# Patient Record
Sex: MALE | Race: WHITE | Employment: OTHER | URBAN - METROPOLITAN AREA
[De-identification: names, ages, dates, MRNs, and addresses within clinical notes are randomized per-mention and may not be internally consistent; named-entity substitution may affect disease eponyms.]

---

## 2018-09-10 ENCOUNTER — PAT TELEPHONE (OUTPATIENT)
Dept: PREADMISSION TESTING | Age: 68
End: 2018-09-10

## 2018-09-10 NOTE — PROGRESS NOTES
[facility] time__1200__________        Surgery time___1300_________    Take the following medications with a sip of water:    Do not eat or drink anything after 12:00 midnight prior to your surgery. EXCEPT PREP  This includes water chewing gum, mints and ice chips. You may brush your teeth and gargle the morning of your surgery, but do not swallow the water      You may be asked to stop blood thinners such as Coumadin, Plavix, Fragmin, Lovenox, etc., or any anti-inflammatories such as:  Aspirin, Ibuprofen, Advil, Naproxen prior to your surgery. We also ask that you stop any OTC medications such as fish oil, vitamin E, glucosamine, garlic, Multivitamins, COQ 10, etc.    We ask that you do not smoke 24 hours prior to surgery  We ask that you do not  drink any alcoholic beverages 24 hours prior to surgery     You must make arrangements for a responsible adult to take you home after your surgery. For your safety you will not be allowed to leave alone or drive yourself home. Your surgery will be cancelled if you do not have a ride home. Also for your safety, it is strongly suggested that someone stay with you the first 24 hours after your surgery. A parent or legal guardian must accompany a child scheduled for surgery and plan to stay at the hospital until the child is discharged. Please do not bring other children with you. For your comfort, please wear simple loose fitting clothing to the hospital.  Please do not bring valuables. Do not wear any make-up or nail polish on your fingers or toes      For your safety, please do not wear any jewelry or body piercing's on the day of surgery. All jewelry must be removed. If you have dentures, they will be removed before going to operating room. For your convenience, we will provide you with a container.     If you wear contact lenses or glasses, they will be removed, please bring a case for

## 2018-09-21 ENCOUNTER — HOSPITAL ENCOUNTER (OUTPATIENT)
Dept: ENDOSCOPY | Age: 68
Discharge: HOME | End: 2018-09-22
Attending: INTERNAL MEDICINE | Admitting: INTERNAL MEDICINE

## 2018-09-21 ENCOUNTER — HOSPITAL ENCOUNTER (INPATIENT)
Dept: ICU | Age: 68
LOS: 2 days | Discharge: HOME | DRG: 919 | End: 2018-09-23
Attending: INTERNAL MEDICINE | Admitting: INTERNAL MEDICINE
Payer: MEDICARE

## 2018-09-21 VITALS
TEMPERATURE: 97.6 F | RESPIRATION RATE: 16 BRPM | OXYGEN SATURATION: 97 % | DIASTOLIC BLOOD PRESSURE: 78 MMHG | WEIGHT: 191 LBS | BODY MASS INDEX: 23.26 KG/M2 | SYSTOLIC BLOOD PRESSURE: 128 MMHG | HEART RATE: 78 BPM | HEIGHT: 76 IN

## 2018-09-21 DIAGNOSIS — Z12.11 ENCOUNTER FOR SCREENING FOR MALIGNANT NEOPLASM OF COLON: ICD-10-CM

## 2018-09-21 DIAGNOSIS — K62.5 RECTAL BLEEDING: Primary | ICD-10-CM

## 2018-09-21 DIAGNOSIS — R10.84 GENERALIZED ABDOMINAL PAIN: ICD-10-CM

## 2018-09-21 LAB
A/G RATIO: 1.4 (ref 1.1–2.2)
ABO/RH: NORMAL
ALBUMIN SERPL-MCNC: 3.9 G/DL (ref 3.4–5)
ALP BLD-CCNC: 56 U/L (ref 40–129)
ALT SERPL-CCNC: 25 U/L (ref 10–40)
ANION GAP SERPL CALCULATED.3IONS-SCNC: 12 MMOL/L (ref 3–16)
ANTIBODY SCREEN: NORMAL
APTT: 31.3 SEC (ref 26–36)
AST SERPL-CCNC: 27 U/L (ref 15–37)
BASOPHILS ABSOLUTE: 0 K/UL (ref 0–0.2)
BASOPHILS RELATIVE PERCENT: 0.3 %
BILIRUB SERPL-MCNC: 1 MG/DL (ref 0–1)
BUN BLDV-MCNC: 12 MG/DL (ref 7–20)
CALCIUM SERPL-MCNC: 8.8 MG/DL (ref 8.3–10.6)
CHLORIDE BLD-SCNC: 104 MMOL/L (ref 99–110)
CO2: 23 MMOL/L (ref 21–32)
CREAT SERPL-MCNC: 0.9 MG/DL (ref 0.8–1.3)
EOSINOPHILS ABSOLUTE: 0.2 K/UL (ref 0–0.6)
EOSINOPHILS RELATIVE PERCENT: 1.9 %
GFR AFRICAN AMERICAN: >60
GFR NON-AFRICAN AMERICAN: >60
GLOBULIN: 2.7 G/DL
GLUCOSE BLD-MCNC: 109 MG/DL (ref 70–99)
GLUCOSE BLD-MCNC: 93 MG/DL (ref 70–99)
HCT VFR BLD CALC: 39.6 % (ref 40.5–52.5)
HCT VFR BLD CALC: 40.1 % (ref 40.5–52.5)
HCT VFR BLD CALC: 46 % (ref 40.5–52.5)
HEMOGLOBIN: 13.3 G/DL (ref 13.5–17.5)
HEMOGLOBIN: 13.5 G/DL (ref 13.5–17.5)
HEMOGLOBIN: 15.4 G/DL (ref 13.5–17.5)
INR BLD: 1.09 (ref 0.86–1.14)
LYMPHOCYTES ABSOLUTE: 1.7 K/UL (ref 1–5.1)
LYMPHOCYTES RELATIVE PERCENT: 20.6 %
MCH RBC QN AUTO: 30.9 PG (ref 26–34)
MCHC RBC AUTO-ENTMCNC: 33.6 G/DL (ref 31–36)
MCV RBC AUTO: 92.1 FL (ref 80–100)
MONOCYTES ABSOLUTE: 0.4 K/UL (ref 0–1.3)
MONOCYTES RELATIVE PERCENT: 5.4 %
NEUTROPHILS ABSOLUTE: 5.7 K/UL (ref 1.7–7.7)
NEUTROPHILS RELATIVE PERCENT: 71.8 %
PDW BLD-RTO: 14.3 % (ref 12.4–15.4)
PERFORMED ON: NORMAL
PLATELET # BLD: 173 K/UL (ref 135–450)
PMV BLD AUTO: 8.2 FL (ref 5–10.5)
POTASSIUM REFLEX MAGNESIUM: 4 MMOL/L (ref 3.5–5.1)
PROTHROMBIN TIME: 12.4 SEC (ref 9.8–13)
RBC # BLD: 4.99 M/UL (ref 4.2–5.9)
SODIUM BLD-SCNC: 139 MMOL/L (ref 136–145)
TOTAL PROTEIN: 6.6 G/DL (ref 6.4–8.2)
WBC # BLD: 8 K/UL (ref 4–11)

## 2018-09-21 PROCEDURE — 74174 CTA ABD&PLVS W/CONTRAST: CPT

## 2018-09-21 PROCEDURE — 96361 HYDRATE IV INFUSION ADD-ON: CPT

## 2018-09-21 PROCEDURE — 86850 RBC ANTIBODY SCREEN: CPT

## 2018-09-21 PROCEDURE — 85730 THROMBOPLASTIN TIME PARTIAL: CPT

## 2018-09-21 PROCEDURE — 85025 COMPLETE CBC W/AUTO DIFF WBC: CPT

## 2018-09-21 PROCEDURE — 6360000002 HC RX W HCPCS

## 2018-09-21 PROCEDURE — 2500000003 HC RX 250 WO HCPCS

## 2018-09-21 PROCEDURE — 2720000010 HC SURG SUPPLY STERILE

## 2018-09-21 PROCEDURE — 96375 TX/PRO/DX INJ NEW DRUG ADDON: CPT

## 2018-09-21 PROCEDURE — 3700000000 HC ANESTHESIA ATTENDED CARE

## 2018-09-21 PROCEDURE — 3609010600 HC COLONOSCOPY POLYPECTOMY SNARE/COLD BIOPSY

## 2018-09-21 PROCEDURE — 96374 THER/PROPH/DIAG INJ IV PUSH: CPT

## 2018-09-21 PROCEDURE — 9990 CHARGE CONVERSION

## 2018-09-21 PROCEDURE — 86901 BLOOD TYPING SEROLOGIC RH(D): CPT

## 2018-09-21 PROCEDURE — 80053 COMPREHEN METABOLIC PANEL: CPT

## 2018-09-21 PROCEDURE — 86923 COMPATIBILITY TEST ELECTRIC: CPT

## 2018-09-21 PROCEDURE — 85014 HEMATOCRIT: CPT

## 2018-09-21 PROCEDURE — 99285 EMERGENCY DEPT VISIT HI MDM: CPT

## 2018-09-21 PROCEDURE — 88305 TISSUE EXAM BY PATHOLOGIST: CPT

## 2018-09-21 PROCEDURE — 86900 BLOOD TYPING SEROLOGIC ABO: CPT

## 2018-09-21 PROCEDURE — 36415 COLL VENOUS BLD VENIPUNCTURE: CPT

## 2018-09-21 PROCEDURE — 85018 HEMOGLOBIN: CPT

## 2018-09-21 PROCEDURE — 85610 PROTHROMBIN TIME: CPT

## 2018-09-21 PROCEDURE — 3700000001 HC ADD 15 MINUTES (ANESTHESIA)

## 2018-09-21 RX ORDER — MORPHINE SULFATE 4 MG/ML
1 INJECTION, SOLUTION INTRAMUSCULAR; INTRAVENOUS EVERY 5 MIN PRN
Status: DISCONTINUED | OUTPATIENT
Start: 2018-09-21 | End: 2018-09-28 | Stop reason: HOSPADM

## 2018-09-21 RX ORDER — OXYCODONE HYDROCHLORIDE AND ACETAMINOPHEN 5; 325 MG/1; MG/1
1 TABLET ORAL PRN
Status: ACTIVE | OUTPATIENT
Start: 2018-09-21 | End: 2018-09-21

## 2018-09-21 RX ORDER — PROMETHAZINE HYDROCHLORIDE 25 MG/ML
12.5 INJECTION, SOLUTION INTRAMUSCULAR; INTRAVENOUS ONCE
Status: COMPLETED | OUTPATIENT
Start: 2018-09-21 | End: 2018-09-21

## 2018-09-21 RX ORDER — SODIUM CHLORIDE 0.9 % (FLUSH) 0.9 %
10 SYRINGE (ML) INJECTION PRN
Status: DISCONTINUED | OUTPATIENT
Start: 2018-09-21 | End: 2018-09-28 | Stop reason: HOSPADM

## 2018-09-21 RX ORDER — 0.9 % SODIUM CHLORIDE 0.9 %
250 INTRAVENOUS SOLUTION INTRAVENOUS ONCE
Status: DISCONTINUED | OUTPATIENT
Start: 2018-09-21 | End: 2018-09-23 | Stop reason: HOSPADM

## 2018-09-21 RX ORDER — FENTANYL CITRATE 50 UG/ML
25 INJECTION, SOLUTION INTRAMUSCULAR; INTRAVENOUS EVERY 5 MIN PRN
Status: DISCONTINUED | OUTPATIENT
Start: 2018-09-21 | End: 2018-09-28 | Stop reason: HOSPADM

## 2018-09-21 RX ORDER — SODIUM CHLORIDE 0.9 % (FLUSH) 0.9 %
10 SYRINGE (ML) INJECTION PRN
Status: DISCONTINUED | OUTPATIENT
Start: 2018-09-21 | End: 2018-09-23 | Stop reason: HOSPADM

## 2018-09-21 RX ORDER — NICOTINE 21 MG/24HR
1 PATCH, TRANSDERMAL 24 HOURS TRANSDERMAL DAILY PRN
Status: DISCONTINUED | OUTPATIENT
Start: 2018-09-21 | End: 2018-09-23 | Stop reason: HOSPADM

## 2018-09-21 RX ORDER — FENTANYL CITRATE 50 UG/ML
50 INJECTION, SOLUTION INTRAMUSCULAR; INTRAVENOUS EVERY 5 MIN PRN
Status: DISCONTINUED | OUTPATIENT
Start: 2018-09-21 | End: 2018-09-28 | Stop reason: HOSPADM

## 2018-09-21 RX ORDER — SODIUM CHLORIDE 9 MG/ML
INJECTION, SOLUTION INTRAVENOUS CONTINUOUS
Status: DISCONTINUED | OUTPATIENT
Start: 2018-09-21 | End: 2018-09-21 | Stop reason: SDUPTHER

## 2018-09-21 RX ORDER — ONDANSETRON 2 MG/ML
4 INJECTION INTRAMUSCULAR; INTRAVENOUS EVERY 4 HOURS PRN
Status: DISCONTINUED | OUTPATIENT
Start: 2018-09-21 | End: 2018-09-23 | Stop reason: HOSPADM

## 2018-09-21 RX ORDER — SODIUM CHLORIDE 0.9 % (FLUSH) 0.9 %
10 SYRINGE (ML) INJECTION PRN
Status: DISCONTINUED | OUTPATIENT
Start: 2018-09-21 | End: 2018-09-21 | Stop reason: SDUPTHER

## 2018-09-21 RX ORDER — METOCLOPRAMIDE HYDROCHLORIDE 5 MG/ML
5 INJECTION INTRAMUSCULAR; INTRAVENOUS ONCE
Status: COMPLETED | OUTPATIENT
Start: 2018-09-21 | End: 2018-09-21

## 2018-09-21 RX ORDER — SODIUM CHLORIDE 0.9 % (FLUSH) 0.9 %
10 SYRINGE (ML) INJECTION EVERY 12 HOURS SCHEDULED
Status: DISCONTINUED | OUTPATIENT
Start: 2018-09-21 | End: 2018-09-23 | Stop reason: HOSPADM

## 2018-09-21 RX ORDER — FAMOTIDINE 20 MG/1
20 TABLET, FILM COATED ORAL 2 TIMES DAILY
Status: DISCONTINUED | OUTPATIENT
Start: 2018-09-21 | End: 2018-09-21

## 2018-09-21 RX ORDER — CHLORAL HYDRATE 500 MG
1000 CAPSULE ORAL DAILY
COMMUNITY

## 2018-09-21 RX ORDER — SODIUM CHLORIDE 0.9 % (FLUSH) 0.9 %
10 SYRINGE (ML) INJECTION EVERY 12 HOURS SCHEDULED
Status: DISCONTINUED | OUTPATIENT
Start: 2018-09-21 | End: 2018-09-21 | Stop reason: SDUPTHER

## 2018-09-21 RX ORDER — MORPHINE SULFATE 4 MG/ML
2 INJECTION, SOLUTION INTRAMUSCULAR; INTRAVENOUS EVERY 5 MIN PRN
Status: DISCONTINUED | OUTPATIENT
Start: 2018-09-21 | End: 2018-09-28 | Stop reason: HOSPADM

## 2018-09-21 RX ORDER — 0.9 % SODIUM CHLORIDE 0.9 %
1000 INTRAVENOUS SOLUTION INTRAVENOUS ONCE
Status: COMPLETED | OUTPATIENT
Start: 2018-09-21 | End: 2018-09-21

## 2018-09-21 RX ORDER — ONDANSETRON 2 MG/ML
4 INJECTION INTRAMUSCULAR; INTRAVENOUS
Status: ACTIVE | OUTPATIENT
Start: 2018-09-21 | End: 2018-09-21

## 2018-09-21 RX ORDER — SODIUM CHLORIDE 9 MG/ML
INJECTION, SOLUTION INTRAVENOUS CONTINUOUS
Status: DISCONTINUED | OUTPATIENT
Start: 2018-09-21 | End: 2018-09-28 | Stop reason: HOSPADM

## 2018-09-21 RX ORDER — SODIUM CHLORIDE 9 MG/ML
INJECTION, SOLUTION INTRAVENOUS CONTINUOUS
Status: DISCONTINUED | OUTPATIENT
Start: 2018-09-22 | End: 2018-09-23 | Stop reason: HOSPADM

## 2018-09-21 RX ORDER — SODIUM CHLORIDE 0.9 % (FLUSH) 0.9 %
10 SYRINGE (ML) INJECTION EVERY 12 HOURS SCHEDULED
Status: DISCONTINUED | OUTPATIENT
Start: 2018-09-21 | End: 2018-09-28 | Stop reason: HOSPADM

## 2018-09-21 RX ORDER — OXYCODONE HYDROCHLORIDE AND ACETAMINOPHEN 5; 325 MG/1; MG/1
2 TABLET ORAL PRN
Status: ACTIVE | OUTPATIENT
Start: 2018-09-21 | End: 2018-09-21

## 2018-09-21 RX ORDER — PERPHENAZINE/AMITRIPTYLINE HCL 2 MG-10 MG
4 TABLET ORAL DAILY
COMMUNITY

## 2018-09-21 RX ADMIN — METOCLOPRAMIDE HYDROCHLORIDE 5 MG: 5 INJECTION INTRAMUSCULAR; INTRAVENOUS at 18:31

## 2018-09-21 RX ADMIN — Medication 10 ML: at 21:00

## 2018-09-21 RX ADMIN — PROMETHAZINE HYDROCHLORIDE 12.5 MG: 25 INJECTION, SOLUTION INTRAMUSCULAR; INTRAVENOUS at 17:11

## 2018-09-21 RX ADMIN — Medication 1000 ML: at 15:02

## 2018-09-21 RX ADMIN — SODIUM CHLORIDE: 9 INJECTION, SOLUTION INTRAVENOUS at 12:20

## 2018-09-21 RX ADMIN — SODIUM CHLORIDE 999 ML/HR: 9 INJECTION, SOLUTION INTRAVENOUS at 17:34

## 2018-09-21 ASSESSMENT — PAIN DESCRIPTION - LOCATION: LOCATION: ABDOMEN

## 2018-09-21 ASSESSMENT — PAIN SCALES - GENERAL
PAINLEVEL_OUTOF10: 0
PAINLEVEL_OUTOF10: 7

## 2018-09-21 ASSESSMENT — LIFESTYLE VARIABLES: SMOKING_STATUS: 0

## 2018-09-21 ASSESSMENT — PAIN DESCRIPTION - PAIN TYPE: TYPE: ACUTE PAIN

## 2018-09-21 ASSESSMENT — PAIN DESCRIPTION - FREQUENCY: FREQUENCY: CONTINUOUS

## 2018-09-21 NOTE — ANESTHESIA PRE-OP
Department of Anesthesiology  Preprocedure Note       Name:  Lillian Nance   Age:  80 y.o.  :  1950                                          MRN:  [id number]         Date:  2018      [facility] Department of Anesthesiology  Pre-Anesthesia Evaluation/Consultation       Name:  Lillian Nance                                         Age:  80 y.o. MRN:  [id number]           Procedure (Scheduled):  colonoscopy  Surgeon:  Dr. Nathan Mclean [Meperidine]      There is no problem list on file for this patient. No past medical history on file. Past Surgical History:   Procedure Laterality Date    BACK SURGERY       Social History   Substance Use Topics    Smoking status: Never Smoker    Smokeless tobacco: Never Used    Alcohol use Yes     Medications  No current outpatient prescriptions on file prior to encounter. No current facility-administered medications on file prior to encounter. No current outpatient prescriptions on file. No current facility-administered medications for this encounter. Vital Signs (Current) There were no vitals filed for this visit. Vital Signs Statistics (for past 48 hrs)     No Data Recorded    BP Readings from Last 3 Encounters:   No data found for BP     BMI  There is no height or weight on file to calculate BMI. Estimated body mass index is 23.61 kg/m² as calculated from the following:    Height as of 18: 6' 4\" (1.93 m). Weight as of 18: 194 lb (88 kg). CBC No results found for: WBC, RBC, HGB, HCT, MCV, RDW, PLT  CMP  No results found for: NA, K, CL, CO2, BUN, CREATININE, GFRAA, AGRATIO, LABGLOM, GLUCOSE, PROT, CALCIUM, BILITOT, ALKPHOS, AST, ALT  BMP  No results found for: NA, K, CL, CO2, BUN, CREATININE, CALCIUM, GFRAA, LABGLOM, GLUCOSE  POCGlucose  No results for input(s): GLUCOSE in the last 72 hours.    Coags  No results found for: PROTIME, INR, APTT  HCG (If Applicable) No results found for: PREGTESTUR, PREGSERUM, HCG, HCGQUANT   ABGs No results found for: PHART, PO2ART, LWI2KUD, HEJ0QRL, BEART, J5JYDKPF   Type & Screen (If Applicable)  No results found for: LABABO, [address]    Surgeon:    Procedure:    Medications prior to admission:   Prior to Admission medications    Not on File       Current medications:    No current outpatient prescriptions on file. No current facility-administered medications for this encounter. Allergies: Allergies   Allergen Reactions    Demerol Hcl [Meperidine]        Problem List:  There is no problem list on file for this patient. Past Medical History:  No past medical history on file. Past Surgical History:        Procedure Laterality Date    BACK SURGERY         Social History:    Social History   Substance Use Topics    Smoking status: Never Smoker    Smokeless tobacco: Never Used    Alcohol use Yes                                Counseling given: Not Answered      Vital Signs (Current): There were no vitals filed for this visit. BP Readings from Last 3 Encounters:   No data found for BP       NPO Status:  prep 10 am                                                                               BMI:   Wt Readings from Last 3 Encounters:   09/17/18 194 lb (88 kg)     There is no height or weight on file to calculate BMI. Anesthesia Evaluation  Patient summary reviewed no history of anesthetic complications:   Airway: Mallampati: III  TM distance: <3 FB   Neck ROM: full  Mouth opening: > = 3 FB Dental:          Pulmonary:Negative Pulmonary ROS breath sounds clear to auscultation      (-) not a current smoker          Patient did not smoke on day of surgery.                  Cardiovascular:Negative CV ROS            Rhythm: regular  Rate: normal           Beta Blocker:  Not on Beta Blocker         Neuro/Psych:   Negative Neuro/Psych ROS              GI/Hepatic/Renal:   (+) bowel prep,      (-) liver

## 2018-09-21 NOTE — H&P
[facility]   Pre-operative History and Physical    Patient: Jennifer Hernandez  : 1950  Acct#:         HISTORY OF PRESENT ILLNESS:    The patient is a 80 y.o. male  who presents with colon cancer screening  Past Medical History:    History reviewed. No pertinent past medical history. Past Surgical History:        Procedure Laterality Date    BACK SURGERY       Medications Prior to Admission:   No current outpatient prescriptions on file prior to encounter. No current facility-administered medications on file prior to encounter. Allergies:  Demerol hcl [meperidine]    Social History:      Social History     Social History    Marital status:      Spouse name: N/A    Number of children: N/A    Years of education: N/A     Occupational History    Not on file. Social History Main Topics    Smoking status: Never Smoker    Smokeless tobacco: Never Used    Alcohol use Yes    Drug use: Unknown    Sexual activity: Not on file     Other Topics Concern    Not on file     Social History Narrative    No narrative on file           Family History:   Family History   Problem Relation Age of Onset    Heart Disease Mother     Cancer Father          PHYSICAL EXAM:      /78   Pulse 78   Temp 97.6 °F (36.4 °C) (Temporal)   Resp 16   Ht 6' 4\" (1.93 m)   Wt 191 lb (86.6 kg)   SpO2 97%   BMI 23.25 kg/m²  I        Heart: Normal    Lungs: Normal    Abdomen: Normal      ASA Grade: ASA 2 - Patient with mild systemic disease with no functional limitations    III (soft palate, base of uvula visible)  ASSESSMENT AND PLAN:    1. Patient is a 80 y.o. male here for Colonoscopy  2. Procedure options, risks and benefits reviewed with patient who expresses understanding.

## 2018-09-21 NOTE — H&P
smokeless tobacco.  ETOH:   reports that he drinks alcohol. OCCUPATION:      REVIEW OF SYSTEMS:  A full review of systems was performed and is negative except for that which appears in the HPI    Physical Exam:    Vitals: BP 95/73   Pulse 81   Temp 97.5 °F (36.4 °C) (Oral)   Resp 15   Ht 6' 4\" (1.93 m)   Wt 195 lb 15.8 oz (88.9 kg)   SpO2 96%   BMI 23.86 kg/m²   General appearance: WD/WN 80y.o. year-old  male who is alert, appears stated age and is cooperative  HEENT: Head: Normocephalic, no lesions, without obvious abnormality. Eye: Normal external eye, conjunctiva, lids cornea, JUDY. Ears: Normal external ears. Non-tender. Nose: Normal external nose, mucus membranes and septum. Pharynx: Dental Hygiene adequate. Normal buccal mucosa. Normal pharynx. Neck: no adenopathy, no carotid bruit, no JVD, supple, symmetrical, trachea midline and thyroid not enlarged, symmetric, no tenderness/mass/nodules  Lungs: clear to auscultation bilaterally and no use of accessory muscles. Heart: regular rate and rhythm, S1, S2 normal, no murmur, click, rub or gallop and normal apical impulse  Abdomen: soft, non-tender; bowel sounds normal; no masses,  no organomegaly  Extremities: extremities atraumatic, no cyanosis or edema and Homans sign is negative, no sign of DVT. Capillary Refill: Acceptable < 3 seconds  Peripheral Pulses: +3 easily felt, not easily obliterated with pressures  Skin: Skin color is pale, texture, turgor normal. No rashes or lesions on exposed skin  Neurologic: Neurovascularly intact without any focal sensory/motor deficits. Cranial nerves: II-XII intact, grossly non-focal. Gait was not tested.   Psychiatric: Alert and oriented, thought content appropriate, normal insight    CBC:   Recent Labs      09/21/18   1456  09/21/18   1718   WBC  8.0   --    HGB  15.4  13.5   PLT  173   --      BMP:    Recent Labs      09/21/18   1456   NA  139   K  4.0   CL  104   CO2  23   BUN  12   CREATININE  0.9 organ failure. DVT Prophylaxis: SCDs  Diet: Diet NPO Effective Now  Code Status: No Order  (Advanced care planning has been discussed with patient and/or responsible family member and is reflected in the code status. Further orders associated with this have been entered if appropriate)    Disposition: Anticipate that patient will remain in the hospital for 2 to 3 days depending on further evaluation and clinical course.      Jai Arrington MD

## 2018-09-21 NOTE — ED NOTES
RN to room. Pt alert and oriented x3. Pt wife at bedside. Pt and pt family denies any needs at this time.      Jb THAD Alford  09/21/18 4184

## 2018-09-21 NOTE — CONSULTS
--    INR   --   1.09   AST  27   --    ALT  25   --    BILITOT  1.0   --      Recent Labs      09/21/18   1456   ALKPHOS  56   ALT  25   AST  27   BILITOT  1.0   LABALBU  3.9         RADIOLOGY:   I have personally reviewed the following films:  No orders to display        Thank you for the interesting evaluation. Further recommendations to follow.       Electronically signed by ARMEN Cantrell CNP on 9/21/2018 at 5:06 PM

## 2018-09-21 NOTE — PROGRESS NOTES
Medication Reconciliation     List of medications patient is currently taking is complete. Source of information: 1. Conversation with patient and family at bedside                                       2. EPIC records      Allergies  Demerol hcl [meperidine]     Notes regarding home medications:   1. Patient reported he only takes fish oil and astaxanthin daily and has not had them today.     Amira Combs, 2019 PharmD Candidate  9/21/2018 4:00 PM

## 2018-09-21 NOTE — ED PROVIDER NOTES
I independently performed a history and physical on Gino Brannon. All diagnostic, treatment, and disposition decisions were made by myself in conjunction with the advanced practice provider. Briefly, this is a 80 y.o. male here for rectal bleeding. Patient basically was sent over from the colonoscopy suite, where he was having coffee done. The patient had a polyp removed, and had bleeding afterwards, and is having persistent bleeding. On exam, patient is pale, and diaphoretic upon my arrival in the room. The patient has had a large bloody bowel movement, and his hemoglobin does appear to be stable to present time. Patient will be transfused a unit of blood because he had a vagal episode, and does appear to be hypotensive. Patient did drop to grams of hemoglobin, and we decided to change the one needs to, and the patient will go to the ICU. I did speak with Dr. Verdugo, and he is aware of the patient's present time. For further details of [facility] emergency department encounter, please see documentation by advanced practice provider  Malorie Bynum.         Manju Mercer MD  09/21/18 6675

## 2018-09-21 NOTE — PROGRESS NOTES
2122 Patient admitted to room 2122 from ER. Patient oriented to room, call light, bed rails, phone, lights and bathroom. Patient instructed about the schedule of the day including: vital sign frequency, lab draws, possible tests, frequency of MD and staff rounds, including RN/MD rounding together at bedside, daily weights, and I &O's. Patient instructed about prescribed diet, how to use 8MENU, and television. Active bed alarm in place, patient aware of placement and reason. ICU telemetry in place, patient aware of placement and reason. Bed locked, in lowest position, side rails up 2/4, call light within reach. Will continue to monitor. 2030 Pt able to walk to [facility], pt had large bloody stool with clots mixed with urine. Output documented. 2035 spoke with Dr. Ga Keen, new orders obtained, updated MD on pt's status, colonoscopy scheduled for AM.     2100 spoke with hospitalist MD regarding blood administration. parameters given. 2230 medication initiated see MAR.     0000 pt NPO and medication started see MAR.     0200 pt c/o nausea from medication. Reeducated pt for medication and agreed to attempt to completed the remaining medication. 0600 Pt's stool bloody without blood clots.

## 2018-09-21 NOTE — SEDATION DOCUMENTATION
1230 [facility] to call 911 per dr Kiran Ramon and report called to [address] [place] [address],[address] ER and Dr Porfirio Taylor in room at this time. Vs stable.

## 2018-09-21 NOTE — ED PROVIDER NOTES
[facility] EMERGENCY DEPT  [address] [facility] [address]  Dept: [phone]  Loc: [phone]    eMERGENCY dEPARTMENT eNCOUnter      CHIEF COMPLAINT    Chief Complaint   Patient presents with    Rectal Bleeding     s/p colonoscopy polyp removal        HPI    Dolores Keller is a 80 y.o. male who arrives to the emergency department via EMS from [facility]. Patient had a large polyp removed and it was clipped. The GI physician reported that he had a large amount of bleeding and it was difficult to control after he injected epinephrine so they called 911 and had the patient transported to [facility] ED. The patient arrives with his spouse. The patient arrives still a little sedated from his anesthesia but he is arousing appropriately and he is able to answer questions. He denies chest pain or shortness of breath. He is reporting a generalized abdominal pain. He denies medical problems. He takes fish oil. He is on no anticoagulants. He takes no aspirin. He has never had a colonoscopy before. REVIEW OF SYSTEMS    GI: see HPI, no vomiting or hematemesis  Cardiac: No chest pain or syncope  Pulmonary: No difficulty breathing or new cough  General: No fevers   : No hematuria or dysuria  See HPI for further details. All other systems reviewed and are negative.     PAST MEDICAL & SURGICAL HISTORY    Past Medical History:   Diagnosis Date    Kidney stone      Past Surgical History:   Procedure Laterality Date    BACK SURGERY      KNEE SURGERY         CURRENT MEDICATIONS        ALLERGIES    Allergies   Allergen Reactions    Demerol Hcl [Meperidine]        SOCIAL AND FAMILY HISTORY    Social History     Social History    Marital status:      Spouse name: N/A    Number of children: N/A    Years of education: N/A     Social History Main Topics    Smoking status: Never Smoker    Smokeless tobacco: Never Used    Alcohol use Yes    Drug use: No    Sexual activity: Not Asked     Other Topics Concern    None     Social History Narrative    None     Family History   Problem Relation Age of Onset    Heart Disease Mother     Cancer Father        PHYSICAL EXAM    VITAL SIGNS: BP 99/67   Pulse 90   Temp 98.1 °F (36.7 °C) (Oral)   Resp 19   Ht 6' 4\" (1.93 m)   Wt 190 lb 14.7 oz (86.6 kg)   SpO2 95%   BMI 23.24 kg/m²   Constitutional:  Well developed, well nourished  Eyes:  Sclera nonicteric, conjunctiva moist  HENT:  Atraumatic, nose normal  Neck: Supple, no JVD  Respiratory:  Breath sounds clear throughout auscultation. Respirations are even and unlabored. No cough noted. Cardiovascular:  Regular rhythm and rate, S1 and S2. No murmurs. Radial, pedal posttibial pulses 2+ equal bilaterally. Brisk capillary refill to fingers and toes. Extremities are warm and natural color. No edema noted. GI:  Abdomen is soft, nondistended. He has generalized tenderness. There is no rebound. No guarding. owel sounds are sluggish throughout. Rectal: External rectal exam performed. There is no signs of active bleeding noted. Musculoskeletal:  No edema, no acute deformity  Integument: No rash, dry skin  Neurologic:  Alert & oriented X4, no slurred speech  Psychiatric: Cooperative, pleasant affect     RADIOLOGY/PROCEDURES    CTA ABDOMEN PELVIS W WO CONTRAST   Final Result   1. No acute findings identified in the abdomen and pelvis. No CT evidence of   active hemorrhage in the abdomen and pelvis. Scattered foci of hyperdensity   in the colon likely reflective of blood.            Labs Reviewed   COMPREHENSIVE METABOLIC PANEL W/ REFLEX TO MG FOR LOW K - Abnormal; Notable for the following:        Result Value    Glucose 109 (*)     All other components within normal limits    Narrative:     Performed at:  [address] [address]   Phone [phone]   HEMOGLOBIN AND HEMATOCRIT, BLOOD - Abnormal; Notable for the following:     Hemoglobin 13.3 (*)     Hematocrit 39.6 (*)     All other components within normal limits    Narrative:     Performed at:  [place] [address] [place], [place] [address]   Phone [phone]   HEMOGLOBIN AND HEMATOCRIT, BLOOD - Abnormal; Notable for the following:     Hematocrit 40.1 (*)     All other components within normal limits    Narrative:     Performed at:  [address] [address]   Phone [phone]   CBC WITH AUTO DIFFERENTIAL    Narrative:     Performed at:  [facility]  [address] [place] [address]   Phone [phone]   APTT    Narrative:     Performed at:  [place] [address]   Phone [phone]   PROTIME-INR    Narrative:     Performed at:  [place] [address]   Phone [phone]   BASIC METABOLIC PANEL W/ REFLEX TO MG FOR LOW K    CBC WITH AUTO DIFFERENTIAL   HEMOGLOBIN AND HEMATOCRIT, BLOOD   HEMOGLOBIN AND HEMATOCRIT, BLOOD   POCT GLUCOSE    Narrative:     Performed at:  [address] [address]   Phone [phone]   TYPE AND SCREEN    Narrative:     Performed at:  [address] [address]   Phone [phone]   PREPARE RBC (CROSSMATCH)     CRITICAL CARE NOTE:  There was a high probability of clinically significant life-threatening deterioration of the patient's condition requiring my urgent intervention. Total critical care time is 90 minutes.     This includes multiple reevaluations, vital sign monitoring, pulse oximetry monitoring, telemetry monitoring, clinical response to the IV medications, reviewing the nursing notes, consultation time, dictation/documentation time, and interpretation of the labwork. (This time excludes time spent performing procedures). ED COURSE & MEDICAL DECISION MAKING    Pertinent Labs & Imaging studies reviewed and interpreted. (See chart for details)   See chart for details of medications given during the ED stay. Vitals:    09/21/18 1935 09/21/18 2030 09/21/18 2100 09/21/18 2200   BP: 102/71 108/66 101/64 99/67   Pulse: 79 84 91 90   Resp: 16 19 17 19   Temp:       TempSrc:       SpO2: 97% 98% 96% 95%   Weight:       Height:         Medications   sodium chloride flush 0.9 % injection 10 mL (10 mLs Intravenous Given 9/21/18 2100)   sodium chloride flush 0.9 % injection 10 mL (not administered)   ondansetron (ZOFRAN) injection 4 mg (not administered)   nicotine (NICODERM CQ) 21 MG/24HR 1 patch (not administered)   0.9 % sodium chloride infusion (not administered)   0.9 % sodium chloride bolus (250 mLs Intravenous Canceled Entry 9/21/18 1719)   0.9 % sodium chloride bolus (0 mLs Intravenous Stopped 9/21/18 1707)   promethazine (PHENERGAN) injection 12.5 mg (12.5 mg Intravenous Given 9/21/18 1711)   iopamidol (ISOVUE-370) 76 % injection 75 mL (75 mLs Intravenous Given 9/21/18 1828)   metoclopramide (REGLAN) injection 5 mg (5 mg Intravenous Given 9/21/18 1831)   polyethylene glycol (GoLYTELY) solution 2,000 mL (2,000 mLs Oral Given 9/21/18 2229)     This patient was seen and evaluated by myself and my attending physician Dr. Mallorie Loredo. Differential diagnosis includes but is not limited to quite uropathy, hemorrhagic shock, perforated bowel, unstable bleeding from polypectomy, other. Patient initially is nontoxic in appearance and hemodynamically stable. Per Dr. Caswell Gowers requests I did speak with general surgery Dr. Emilio Meza regarding this patient. Dr. Emilio Meza will call Dr. Angelica Soriano. Dr. Angelica Soriano did come to the emergency department and evaluated this patient.   He will monitor the patient in the hospital and laying in that he was not aware of and had several large clots noted again. This patient's spouse and the multiple family members were updated on a continual basis. The patient will go to the ICU at this time. FINAL IMPRESSION    1. Rectal bleeding    2.  Generalized abdominal pain        PLAN  Inpatient hospitalization to ICU    (Please note that this note was completed with a voice recognition program.  Every attempt was made to edit the dictations, but inevitably there remain words that are mis-transcribed.)           ARMEN Castro - HIEN  09/21/18 6339

## 2018-09-21 NOTE — PROGRESS NOTES
[facility]    Patient admitted with post-polypectomy bleeding after outpatient colonoscopy this afternoon  This was a large pedunculated polyp on a large, thick stalk; the bleeding was such that even with irrigation and suction, I could not identify the bleeding site  He is currently hemodynamically stable with no blood per rectum so far  Hgb, INR  Normal    Plan:    Observe  Serial H/H  Clear liquids po  If the bleeding continues/worsens, will repeat the colonoscopy with a prep to treat the bleeding site endoscopically  Failing this, surgery may be required as a last resort; as a precaution, I have discussed this with Dr. Flavio Lerma, who is aware of the patient and will be available if intervention is necessary  Dr. Lea Varela will cover until [facility]., 09/24/18

## 2018-09-21 NOTE — BRIEF OP NOTE
Brief Postoperative Note  Novelty Labrum  1950  [id number]    Previous Colonoscopy: No  Date: unknown  Greater than 3 years?  No    Pre-operative Diagnosis: Screening    Post-operative Diagnosis: Same    Procedure: Colonoscopy    Anesthesia: MAC    Surgeons/Assistants: Chyna    Estimated Blood Loss: 195     Complications: Bleeding    Specimens: Was Not Obtained    Findings: See dictated report    Electronically signed by Shay Gutiérrez MD, on 9/21/2018, at 2:28 PM

## 2018-09-21 NOTE — ED NOTES
Pt report called to Miah Sotelo RN to assume care of pt. RN states no further questions at this time. Pt to be transported to room 3101. Transportation need placed at this time.       [employer], RN  09/21/18 6743

## 2018-09-21 NOTE — PAYOR INFORMATION
Patient Eric Rocha:  [de-identified]  Primary AUTH/CERT:    [address] Name:   Radha Hilton, O, PPO  Primary Insurance Plan Name:  Jatinder Scales MEDICARE PPO  Primary Insurance Group Number:  [id number]  Primary Insurance Plan Type: N  Primary Insurance Policy Number:  [id number]

## 2018-09-21 NOTE — ED NOTES
Pt to [facility]. RN to room. aprox 700mL of blood/clots from rectum noted. NP notified at this time.         Yuliana Sanford RN  09/21/18 3019

## 2018-09-22 ENCOUNTER — ANESTHESIA EVENT (OUTPATIENT)
Dept: ENDOSCOPY | Age: 68
DRG: 919 | End: 2018-09-22
Payer: MEDICARE

## 2018-09-22 ENCOUNTER — ANESTHESIA (OUTPATIENT)
Dept: ENDOSCOPY | Age: 68
DRG: 919 | End: 2018-09-22
Payer: MEDICARE

## 2018-09-22 VITALS — DIASTOLIC BLOOD PRESSURE: 79 MMHG | SYSTOLIC BLOOD PRESSURE: 141 MMHG

## 2018-09-22 PROBLEM — D62 ACUTE BLOOD LOSS ANEMIA: Status: ACTIVE | Noted: 2018-09-22

## 2018-09-22 PROBLEM — R57.9 SHOCK CIRCULATORY (HCC): Status: ACTIVE | Noted: 2018-09-22

## 2018-09-22 PROBLEM — R11.0 NAUSEA: Status: ACTIVE | Noted: 2018-09-22

## 2018-09-22 LAB
ANION GAP SERPL CALCULATED.3IONS-SCNC: 13 MMOL/L (ref 3–16)
BASOPHILS ABSOLUTE: 0 K/UL (ref 0–0.2)
BASOPHILS RELATIVE PERCENT: 0.3 %
BUN BLDV-MCNC: 13 MG/DL (ref 7–20)
CALCIUM SERPL-MCNC: 7.7 MG/DL (ref 8.3–10.6)
CHLORIDE BLD-SCNC: 110 MMOL/L (ref 99–110)
CO2: 18 MMOL/L (ref 21–32)
CREAT SERPL-MCNC: 0.8 MG/DL (ref 0.8–1.3)
EOSINOPHILS ABSOLUTE: 0 K/UL (ref 0–0.6)
EOSINOPHILS RELATIVE PERCENT: 0.2 %
GFR AFRICAN AMERICAN: >60
GFR NON-AFRICAN AMERICAN: >60
GLUCOSE BLD-MCNC: 97 MG/DL (ref 70–99)
HCT VFR BLD CALC: 30.6 % (ref 40.5–52.5)
HCT VFR BLD CALC: 33.5 % (ref 40.5–52.5)
HEMOGLOBIN: 10.5 G/DL (ref 13.5–17.5)
HEMOGLOBIN: 11.3 G/DL (ref 13.5–17.5)
LYMPHOCYTES ABSOLUTE: 1 K/UL (ref 1–5.1)
LYMPHOCYTES RELATIVE PERCENT: 13.6 %
MCH RBC QN AUTO: 30.9 PG (ref 26–34)
MCHC RBC AUTO-ENTMCNC: 33.7 G/DL (ref 31–36)
MCV RBC AUTO: 91.8 FL (ref 80–100)
MONOCYTES ABSOLUTE: 0.5 K/UL (ref 0–1.3)
MONOCYTES RELATIVE PERCENT: 6.9 %
NEUTROPHILS ABSOLUTE: 6.1 K/UL (ref 1.7–7.7)
NEUTROPHILS RELATIVE PERCENT: 79 %
PDW BLD-RTO: 13.9 % (ref 12.4–15.4)
PLATELET # BLD: 162 K/UL (ref 135–450)
PMV BLD AUTO: 8.7 FL (ref 5–10.5)
POTASSIUM REFLEX MAGNESIUM: 4 MMOL/L (ref 3.5–5.1)
RBC # BLD: 3.65 M/UL (ref 4.2–5.9)
SODIUM BLD-SCNC: 141 MMOL/L (ref 136–145)
WBC # BLD: 7.7 K/UL (ref 4–11)

## 2018-09-22 PROCEDURE — 1200000000 HC SEMI PRIVATE

## 2018-09-22 PROCEDURE — 85018 HEMOGLOBIN: CPT

## 2018-09-22 PROCEDURE — 94762 N-INVAS EAR/PLS OXIMTRY CONT: CPT

## 2018-09-22 PROCEDURE — 99232 SBSQ HOSP IP/OBS MODERATE 35: CPT | Performed by: SURGERY

## 2018-09-22 PROCEDURE — 36415 COLL VENOUS BLD VENIPUNCTURE: CPT

## 2018-09-22 PROCEDURE — 3700000000 HC ANESTHESIA ATTENDED CARE: Performed by: INTERNAL MEDICINE

## 2018-09-22 PROCEDURE — 2709999900 HC NON-CHARGEABLE SUPPLY: Performed by: INTERNAL MEDICINE

## 2018-09-22 PROCEDURE — 6360000002 HC RX W HCPCS: Performed by: ANESTHESIOLOGY

## 2018-09-22 PROCEDURE — 6360000002 HC RX W HCPCS: Performed by: INTERNAL MEDICINE

## 2018-09-22 PROCEDURE — 3700000001 HC ADD 15 MINUTES (ANESTHESIA): Performed by: INTERNAL MEDICINE

## 2018-09-22 PROCEDURE — 80048 BASIC METABOLIC PNL TOTAL CA: CPT

## 2018-09-22 PROCEDURE — 2780000010 HC IMPLANT OTHER: Performed by: INTERNAL MEDICINE

## 2018-09-22 PROCEDURE — 9990 CHARGE CONVERSION

## 2018-09-22 PROCEDURE — APPSS15 APP SPLIT SHARED TIME 0-15 MINUTES: Performed by: NURSE PRACTITIONER

## 2018-09-22 PROCEDURE — 85025 COMPLETE CBC W/AUTO DIFF WBC: CPT

## 2018-09-22 PROCEDURE — 2580000003 HC RX 258: Performed by: INTERNAL MEDICINE

## 2018-09-22 PROCEDURE — APPNB15 APP NON BILLABLE TIME 0-15 MINS: Performed by: NURSE PRACTITIONER

## 2018-09-22 PROCEDURE — 85014 HEMATOCRIT: CPT

## 2018-09-22 PROCEDURE — 3609008600 HC SIGMOIDOSCOPY CONTROL HEMORRHAGE: Performed by: INTERNAL MEDICINE

## 2018-09-22 PROCEDURE — 0W3P8ZZ CONTROL BLEEDING IN GASTROINTESTINAL TRACT, VIA NATURAL OR ARTIFICIAL OPENING ENDOSCOPIC: ICD-10-PCS | Performed by: INTERNAL MEDICINE

## 2018-09-22 DEVICE — CLIP LIG L235CM RESOL 360 BX/20: Type: IMPLANTABLE DEVICE | Status: FUNCTIONAL

## 2018-09-22 RX ORDER — PROPOFOL 10 MG/ML
INJECTION, EMULSION INTRAVENOUS PRN
Status: DISCONTINUED | OUTPATIENT
Start: 2018-09-22 | End: 2018-09-22 | Stop reason: SDUPTHER

## 2018-09-22 RX ADMIN — PROPOFOL 240 MG: 10 INJECTION, EMULSION INTRAVENOUS at 10:36

## 2018-09-22 RX ADMIN — SODIUM CHLORIDE: 9 INJECTION, SOLUTION INTRAVENOUS at 23:05

## 2018-09-22 RX ADMIN — Medication 10 ML: at 08:29

## 2018-09-22 RX ADMIN — SODIUM CHLORIDE: 9 INJECTION, SOLUTION INTRAVENOUS at 00:00

## 2018-09-22 RX ADMIN — SODIUM CHLORIDE: 9 INJECTION, SOLUTION INTRAVENOUS at 10:35

## 2018-09-22 ASSESSMENT — LIFESTYLE VARIABLES: SMOKING_STATUS: 0

## 2018-09-22 ASSESSMENT — PAIN SCALES - GENERAL
PAINLEVEL_OUTOF10: 0

## 2018-09-22 NOTE — PROGRESS NOTES
ADVANCED CARE PLANNING    Name:Jose David Champion Older       :  1950              MRN:  [id number]      Purpose of Encounter: Advanced care planning in light of rectal bleeding  Parties in attendance: :Belkis Vega, Isaak Tinsley MD  Decisional Capacity:Yes    Subjective/Patient Story: Patient understands that his function continues to deteriorate. Patient no longer wishes further curative interventions, including hospitalization, if there is no long term benefit :No  Patient wishes to continue further interventions, patient will re-evaluate at a later time: Yes    Objective/Medical Story: Patient is a 71-year-old man who is otherwise healthy who presents to the emergency room for evaluation of rectal bleeding. He had a colonoscopy a short while ago and had a large polyp removed. Following this, he began to experience rectal bleeding which has been constant since onset. He was sent here from the endoscopy center. The patient reports feeling very weak, and has been diaphoretic and nauseated. He has had episodes of hypotension in the emergency room. His initial hemoglobin was 15.4, but due to the severity of his symptoms it is believed that he is bleeding more rapidly than this may reflect, and two units of packed red blood cells were ordered in the emergency room. Goals of Care Determinations: Patient wishes to focus on treatment and return to home. Plan: Will notify Zhane President of change in care plan. Will look at further interventions as needed. Code Status: At this time patient wishes to be Full Code    Time Spent on Advanced Planning Documents: 30+ minutes    Advanced Care Planning Documents: Completed advances directives, advanced directives in chart. Electronically signed by Isaak Tinsley MD on 2018 at 9:00 AM  Thank you Zhane President for the opportunity to be involved in this patient's care.  If you have any questions or concerns please feel free to contact me at

## 2018-09-22 NOTE — H&P
[facility]   Pre-operative History and Physical    Patient: Leticia Dia  : 1950  Acct#:         HISTORY OF PRESENT ILLNESS:    The patient is a 80 y.o. male  who presents with post-polypectomy bleeding  Past Medical History:        Diagnosis Date    Kidney stone      Past Surgical History:        Procedure Laterality Date    BACK SURGERY      KNEE SURGERY       Medications Prior to Admission:   No current facility-administered medications on file prior to encounter. No current outpatient prescriptions on file prior to encounter. Allergies:  Demerol hcl [meperidine]    Social History:      Social History     Social History    Marital status:      Spouse name: N/A    Number of children: N/A    Years of education: N/A     Occupational History    Not on file. Social History Main Topics    Smoking status: Never Smoker    Smokeless tobacco: Never Used    Alcohol use Yes    Drug use: No    Sexual activity: Not on file     Other Topics Concern    Not on file     Social History Narrative    No narrative on file           Family History:   Family History   Problem Relation Age of Onset    Heart Disease Mother     Cancer Father          PHYSICAL EXAM:      /78   Pulse 78   Temp 97.6 °F (36.4 °C) (Temporal)   Resp 16   Ht 6' 4\" (1.93 m)   Wt 191 lb (86.6 kg)   SpO2 97%   BMI 23.25 kg/m²  I        Heart: Normal    Lungs: Normal    Abdomen: Normal      ASA Grade: ASA 2 - Patient with mild systemic disease with no functional limitations    III (soft palate, base of uvula visible)  ASSESSMENT AND PLAN:    1. Patient is a 80 y.o. male here for Colonoscopy  2. Procedure options, risks and benefits reviewed with patient who expresses understanding.

## 2018-09-22 NOTE — PROGRESS NOTES
Resolution® Clip Device    Date Placed: _______9/22/18__________________  Anatomical Location: _______sigmoid colon____________      MR Conditional     Magnetic Resonance (MR) Information  Non-clinical testing has demonstrated the Resolution® Clip is MR Conditional according to  ASTM . A patient with this clip(s) can be safely scanned under the following conditions:    Static Magnetic Field  Static magnetic field of 1.5 and 3 Chitra with:   Spatial gradient field of 2500 Gauss/cm (value extrapolated) and less   Maximum whole body averaged specific absorption rate (AYAKA) of 2 W/kg in Normal Operating  Mode for a maximum scan time of 15 minutes of continuous scanning at 1.5T and at 3T. MR Related Heating  In non-clinical testing, the Resolution Clips produced a temperature rise of less than 1.4 °C at a maximum  extrapolated WBA AYAKA of 2.0 W/kg for 15 min. of continuous MR scanning with body coil in a 1.5 Chitra [facility] (software: release [de-identified], 2010-12-02) MR Scanner. In non-clinical testing, the Resolution Clips produced a temperature rise of less than 4.0 °C at a maximum  extrapolated WBA AYAKA of 2.0 W/kg for 15 min. of continuous MR scanning with body coil in a 3 Chitra [employer]  [employer], [employer] (software: [employer]/4, syngo MRA30) MR Scanner. Image Artifacts  MR image quality may be compromised if the area of interest is within approximately 80 mm of the clip(s) as  found in non-clinical testing using a spin echo and gradient echo pulse sequence in a 3T MR system Eula Jesus, [facility] ([place], [place], software [de-identified] 2010-11-24). Therefore, it may be necessary  to optimize MR Imaging parameters in the presence of this implant. [employer] recommends that the patient register the MR conditions disclosed in this DFU with the  [employer] ([email or web address]) or equivalent organization.

## 2018-09-22 NOTE — PROGRESS NOTES
[facility] General [facility] Vascular Surgery                                                           Daily Progress Note                                                         Pt Name: Martita Tracy  Medical Record Number: [id number]  Date of Birth 1950   Today's Date: 9/22/2018  CC: BRBPR  ASSESSMENT/PLAN  BRBPR following polypectomy 9/21  -Further bleeding overnight with continued drop in Hg to 11.3.   -Plan is for colonoscopy today with intervention  -Surgery to follow along. Acute blood loss anemia  -following polypectomy  -Trend Hg  -colonoscopy today    Discharge Planning: per primary team    EDUCATION  Patient educated about Disease Process, Medications, Smoking Cessation, Oxygenation, Incentive Spirometry and Deep Breath and Cough, Diabetes, Hyperlipidemia, Smoking Cessation, Nutrition, Exercise and Hypertension    SUBJECTIVE  Radha Henry is unchanged from yesterday. In ICU. Denies pain. OBJECTIVE  VITALS:  height is 6' 4\" (1.93 m) and weight is 190 lb 4.1 oz (86.3 kg). His oral temperature is 97.8 °F (36.6 °C). His blood pressure is 96/64 and his pulse is 84. His respiration is 17 and oxygen saturation is 95%. INTAKE/OUTPUT:    Intake/Output Summary (Last 24 hours) at 09/22/18 6732  Last data filed at 09/22/18 0600   Gross per 24 hour   Intake           372.12 ml   Output                0 ml   Net           372.12 ml     GENERAL: alert, no distress  LUNGS: clear to ausculation, without wheezes, rales or rhonci  HEART: normal rate and regular rhythm  ABDOMEN: soft, non-tender, non-distended and bowel sounds present in all 4 quadrants  EXTREMITY: no cyanosis, clubbing or edema  I/O last 3 completed shifts: In: 372.1 [I.V.:372.1]  Out: -   No intake/output data recorded.     LABS  Recent Labs      09/21/18   1456  09/21/18   1457   09/22/18   0443   WBC  8.0   --    --   7.7   HGB  15.4   --    < >  11.3*   HCT  46.0   --    < >  33.5*   PLT  173 --    --   162   NA  139   --    --   141   K  4.0   --    --   4.0   CL  104   --    --   110   CO2  23   --    --   18*   BUN  12   --    --   13   CREATININE  0.9   --    --   0.8   CALCIUM  8.8   --    --   7.7*   INR   --   1.09   --    --    AST  27   --    --    --    ALT  25   --    --    --    BILITOT  1.0   --    --    --     < > = values in this interval not displayed. Electronically signed by ARMEN Parks - CNP on 9/22/18 at 8:05 AM  [facility]   [phone] Office  [phone] Cell       Surgery Staff  I have examined this patient and read and agree with the note by Horacio Meckel, APRN from today. H/H with slight drift downward. Reports blood per rectum overnight but none this morning  Hemodynamically stable  abd nontender    Await repeat flex sig results  Reserve surgery for acute blood loss or instability.   May require colostomy if colectomy needed  Electronically signed by Noé Medina MD on 9/22/2018 at 10:26 AM

## 2018-09-22 NOTE — PROGRESS NOTES
[facility]    Colonoscopy -   The sigmoid polypectomy site was readily identified  There was no active bleeding but the base of the site contained a small clot  Two Endoclips were placed and the area was injected with 6 ml of 1:10,000 epinephrine    REC:  OK to eat   H/H q 12h  Patient may transfer  If no further issues, home tomorrow

## 2018-09-22 NOTE — ANESTHESIA PRE PROCEDURE
Bev Cornejo  mL/hr at 09/21/18 1220      sodium chloride flush 0.9 % injection 10 mL  10 mL Intravenous 2 times per day Bev Cornejo MD        sodium chloride flush 0.9 % injection 10 mL  10 mL Intravenous PRN Bev Cornejo MD           Allergies: Allergies   Allergen Reactions    Demerol Hcl [Meperidine]        Problem List:    Patient Active Problem List   Diagnosis Code    Rectal bleeding K62.5    Acute blood loss anemia D62    Shock circulatory ([address] R57.9    Nausea R11.0       Past Medical History:        Diagnosis Date    Kidney stone        Past Surgical History:        Procedure Laterality Date    BACK SURGERY      KNEE SURGERY         Social History:    Social History   Substance Use Topics    Smoking status: Never Smoker    Smokeless tobacco: Never Used    Alcohol use Yes                                Counseling given: Not Answered      Vital Signs (Current):   Vitals:    09/22/18 1107 09/22/18 1118 09/22/18 1120 09/22/18 1125   BP: 136/87 129/77 139/73 131/70   Pulse: 92 95 95 92   Resp: 26 20 21 18   Temp:       TempSrc:       SpO2: 100% 99% 95% 98%   Weight:       Height:                                                  BP Readings from Last 3 Encounters:   09/22/18 131/70   09/21/18 128/78       NPO Status:  MN+, PREP                                                                               BMI:   Wt Readings from Last 3 Encounters:   09/22/18 190 lb 4.1 oz (86.3 kg)   09/21/18 191 lb (86.6 kg)   09/17/18 194 lb (88 kg)     Body mass index is 23.16 kg/m².     CBC:   Lab Results   Component Value Date    WBC 7.7 09/22/2018    RBC 3.65 09/22/2018    HGB 11.3 09/22/2018    HCT 33.5 09/22/2018    MCV 91.8 09/22/2018    RDW 13.9 09/22/2018     09/22/2018       CMP:   Lab Results   Component Value Date     09/22/2018    K 4.0 09/22/2018     09/22/2018    CO2 18 09/22/2018    BUN 13 09/22/2018    CREATININE 0.8 09/22/2018    GFRAA >60 09/22/2018    AGRATIO 1.4 09/21/2018    LABGLOM >60 09/22/2018    GLUCOSE 97 09/22/2018    PROT 6.6 09/21/2018    CALCIUM 7.7 09/22/2018    BILITOT 1.0 09/21/2018    ALKPHOS 56 09/21/2018    AST 27 09/21/2018    ALT 25 09/21/2018       POC Tests:   Recent Labs      09/21/18   1440   POCGLU  93       Coags:   Lab Results   Component Value Date    PROTIME 12.4 09/21/2018    INR 1.09 09/21/2018    APTT 31.3 09/21/2018       HCG (If Applicable): No results found for: PREGTESTUR, PREGSERUM, HCG, HCGQUANT     ABGs: No results found for: PHART, PO2ART, YQM7FJZ, ZVO2RPF, BEART, S9FAPQAI     Type & Screen (If Applicable):  No results found for: LABABO, [address]    Anesthesia Evaluation  Patient summary reviewed and Nursing notes reviewed  Airway: Mallampati: II  TM distance: >3 FB   Neck ROM: full  Mouth opening: > = 3 FB Dental:          Pulmonary:Negative Pulmonary ROS breath sounds clear to auscultation      (-) not a current smoker                           Cardiovascular:Negative CV ROS            Rhythm: regular  Rate: normal           Beta Blocker:  Not on Beta Blocker         Neuro/Psych:   Negative Neuro/Psych ROS              GI/Hepatic/Renal:   (+) renal disease: kidney stones, bowel prep,      (-) liver disease       Endo/Other:    (+) blood dyscrasia: anemia:., no malignancy/cancer. (-) arthritis, no malignancy/cancer               Abdominal:       Abdomen: soft. Vascular: negative vascular ROS. Anesthesia Plan      TIVA     ASA 3 - emergent       Induction: intravenous. MIPS: Prophylactic antiemetics administered. Anesthetic plan and risks discussed with patient. This pre-anesthesia assessment may be used as a history and physical.    DOS STAFF ADDENDUM:    Pt seen and examined, chart reviewed (including anesthesia, drug and allergy history). No interval changes to history and physical examination.   Anesthetic plan, risks, benefits, alternatives, and personnel involved discussed with patient. Patient verbalized an understanding and agrees to proceed.       Aaliyah Merritt MD  September 22, 2018  11:33 AM      Aaliyah Merritt MD   9/22/2018

## 2018-09-22 NOTE — PROGRESS NOTES
Hospitalist   Progress Note    Patient Name: Rylee Echevarria  PCP: Carmen Platt  Date of Admission: 9/21/2018    Chief Complaint on Admission: rectal bleeding  Chief diagnosis after evaluation: rectal bleeding s/p polyp removal    Brief Synopsis: Patient 80 y.o. man who is otherwise healthy who was admitted on 9/21/2018 for treatment of rectal bleeding s/p polyp removal    Pt Seen/Examined and Chart Reviewed. Subjective: Pt is feeling better and has no new complaints    Objective: Allergies  Demerol hcl [meperidine]    Medications    Scheduled Meds:   sodium chloride flush  10 mL Intravenous 2 times per day    sodium chloride  250 mL Intravenous Once     Infusions:   sodium chloride 75 mL/hr at 09/22/18 0000     PRN Meds:  sodium chloride flush, ondansetron, nicotine    Physical    VITALS:  BP (!) 101/56   Pulse 89   Temp 98.7 °F (37.1 °C) (Oral)   Resp 22   Ht 6' 4\" (1.93 m)   Wt 190 lb 4.1 oz (86.3 kg)   SpO2 100%   BMI 23.16 kg/m²   CONSTITUTIONAL:  WD/WN 80y.o. year-old  male who is awake, alert, cooperative, no apparent distress, and appears stated age  EYES:  Lids and lashes normal, PERRL, EOMI, sclera clear, conjunctiva normal  ENT:  NC/AT, MMM    NECK:  Supple, symmetrical, trachea midline, no adenopathy  HEMATOLOGIC/LYMPHATICS:  no cervical, supraclavicular or axillary lymphadenopathy  LUNGS:  clear to auscultation bilaterally, No increased work of breathing, good air exchange, no crackles or wheezing  CARDIOVASCULAR:  Regular rate and rhythm, normal S1 and S2, no S3 or S4, and no significant murmurs, rubs or gallops noted. Normal apical impulse. ABDOMEN:  Normal active bowel sounds, soft, non-tender, non-distended, no masses palpated, no organomegally  MUSCULOSKELETAL:  Full range of motion noted. NEUROLOGIC:  Awake, alert, oriented to name, place and time. Cranial nerves II-XII are grossly intact. SKIN:  normal skin color, texture, turgor for age.     Data    CBC with Differential:  Lab Results   Component Value Date    WBC 7.7 09/22/2018    HGB 11.3 09/22/2018    HCT 33.5 09/22/2018     09/22/2018    MCV 91.8 09/22/2018    RDW 13.9 09/22/2018    LYMPHOPCT 13.6 09/22/2018    MONOPCT 6.9 09/22/2018    BASOPCT 0.3 09/22/2018    MONOSABS 0.5 09/22/2018    LYMPHSABS 1.0 09/22/2018    EOSABS 0.0 09/22/2018    BASOSABS 0.0 09/22/2018     BMP:  Lab Results   Component Value Date     09/22/2018    K 4.0 09/22/2018     09/22/2018    CO2 18 09/22/2018    BUN 13 09/22/2018    CREATININE 0.8 09/22/2018    GLUCOSE 97 09/22/2018    CALCIUM 7.7 09/22/2018    GFRAA >60 09/22/2018    LABGLOM >60 09/22/2018     LFT: Lab Results   Component Value Date    ALKPHOS 56 09/21/2018    ALT 25 09/21/2018    AST 27 09/21/2018    PROT 6.6 09/21/2018    BILITOT 1.0 09/21/2018     Magnesium:  No results found for: MG  Phosphorus:  No results found for: PHOS  PT/INR:  No results found for: PTINR  U/A:  No results found for: LEUKOCYTESUR, NITRITE, WBCUA, RBCUA, SPECGRAV, UROBILINOGEN, BILIRUBINUR, BLOODU, GLUCOSEU, PROTEINU  ABG:  No results found for: PHART, RNJ3XRG, W5DDVJPO, GCG1MGO, BEART, THGBART, PO2ART, VWC8ZIY        Intake/Output Summary (Last 24 hours) at 09/22/18 1631  Last data filed at 09/22/18 1400   Gross per 24 hour   Intake          1492.12 ml   Output              100 ml   Net          1392.12 ml       Consults:  IP CONSULT TO GENERAL SURGERY  IP CONSULT TO HOSPITALIST  IP CONSULT TO GI  IP CONSULT TO GI  IP CONSULT TO GI  IP CONSULT TO DIAGNOSTIC RADIOLOGY  IP CONSULT TO GI      JADA/Sunday Shelton 1106 Problems    Diagnosis Date Noted    Acute blood loss anemia [D62] 09/22/2018    Shock circulatory ([address] [R57.9] 09/22/2018    Nausea [R11.0] 09/22/2018    Rectal bleeding [K62.5] 09/21/2018       ASSESSMENT AND PLAN:    Rectal bleeding - s/p removal of large polyp 09/21/2018.   - GI, General Surgery and IR have been consulted.    - A CTA of the abdomen and pelvis failed to show the bleeding source. - Colonoscopy planned for later today  - Monitor in the ICU     Acute blood loss anemia - Appears stable  - on admission, based on symptoms of hypotension, tachycardia, pale skin and diaphoresis. - Transfusion of 2 unitis pRBC's ordered initially. - Monitor Hgb closely and transfuse as needed to maintain a Hgb of 7.0 or greater.      Shock circulatory ([address] - due to above with a BP of 90/65, 95/73. Responding to IV Fluids, transfusions. Monitor in the ICU     Nausea - Will provide symptomatic treatment with Zofran as needed, maintenance of fluids and electrolytes.       DVT Prophylaxis: SCDs  Diet: DIET GENERAL;  Code Status: Full Code    PT/OT Eval Status: Not Ordered    Dispo - Anticipated discharge date 2 days    Noel Capps MD

## 2018-09-22 NOTE — PLAN OF CARE
Problem: Falls - Risk of:  Goal: Will remain free from falls  Will remain free from falls   Outcome: Ongoing  Fall risk precautions in place. Call light within reach. Frequent visual monitoring in place q1h. Bed/chair alarm activated as applicable. Goal: Absence of physical injury  Absence of physical injury   Outcome: Met This Shift      Problem:  Bowel Function - Altered:  Goal: Bowel elimination is within specified parameters  Bowel elimination is within specified parameters   Outcome: Ongoing      Problem: Fluid Volume - Imbalance:  Goal: Will show no signs and symptoms of excessive bleeding  Will show no signs and symptoms of excessive bleeding   Outcome: Met This Shift    Goal: Absence of imbalanced fluid volume signs and symptoms  Absence of imbalanced fluid volume signs and symptoms   Outcome: Met This Shift      Problem: Nausea/Vomiting:  Goal: Absence of nausea/vomiting  Absence of nausea/vomiting   Outcome: Completed Date Met: 09/22/18    Goal: Able to drink  Able to drink   Outcome: Met This Shift    Goal: Able to eat  Able to eat   Outcome: Met This Shift    Goal: Ability to achieve adequate nutritional intake will improve  Ability to achieve adequate nutritional intake will improve   Outcome: Met This Shift

## 2018-09-22 NOTE — FLOWSHEET NOTE
0730 Pt awake, alert, denies pain, sob, was nauseated earlier and can't finish 3 glasses of Go-litely that are on bedside table. BP 96/sys, MAP 71, monitor SR. NS at 75cc/hr. 0800 Assessment done and negative. 100cc urine with stool in commode, red tinged and consistency of urine. Denies nausea at present. Loc.Pounds Consent signed by pt.  0830 Rec/d call from Endo and procedure delayed until 1000am.  1000 Dr Merline Auerbach into see pt and family, updated, no new orders. 56 Dr Susanne Lee and endo nurses at bedside. 1115 Endo completed. Dr Susanne Lee updated family in waiting area. Report rec/d from Sandeep Coates and Dr Rohini Wolf at bedside. Pt responds to name, /sys, oximetry good on room air., monitor SR with rare pac and pvc. Admits to discomfort in lower abd when questioned. This is from epi and clips and to be expected. 1215 pt awake, still with lower abd pain. Given ice chips and water, doesn't want food yet. VSS. Noting episodes of quadrigeminal pvcs. 1350  Pt took all of regular diet. States he \"feels great\". Allowed pt to use bathroom on request. States he had a loose \"brown\" bm, no sign of \"blood\". Asked pt to let nurses check stools. Pt's HR up to 130 while up, denies symptoms. Back to bed. Faimily present. /sys. 1630 VSS, switched to tele monitor. Watching tv. Denies any discomfort. 1800 Tolerated reg diet.  Voices n/c. Electronically signed by Gulshan Pemberton RN on 9/22/2018 at 6:37 PM

## 2018-09-22 NOTE — ANESTHESIA POSTPROCEDURE EVALUATION
Department of Anesthesiology  Postprocedure Note    Patient: Dolores Keller  MRN: [id number]  YOB: 1950  Date of evaluation: 9/22/2018  Time:  12:06 PM     Procedure Summary     Date:  09/22/18 Room / Location:  [facility] ENDO 02 / [facility] ENDOSCOPY    Anesthesia Start:  1035 Anesthesia Stop:  1110    Procedures:       SIGMOIDOSCOPY CONTROL HEMORRHAGE (N/A )      PRE ENDO Diagnosis:  (gi bleed)    Surgeon:  Marly Patton MD Responsible Provider:  Luís Valdez MD    Anesthesia Type:  TIVA ASA Status:  3 - Emergent          Anesthesia Type: TIVA    Rancho Phase I:      Rancho Phase II:      Last vitals: Reviewed and per EMR flowsheets.    Vitals:    09/22/18 1125   BP: 131/70   Pulse: 92   Resp: 18   Temp:    SpO2: 98%         Anesthesia Post Evaluation    Patient location during evaluation: bedside  Patient participation: complete - patient participated  Level of consciousness: awake and alert  Pain score: 2  Airway patency: patent  Nausea & Vomiting: no nausea  Complications: no  Cardiovascular status: blood pressure returned to baseline and hemodynamically stable  Respiratory status: room air and acceptable  Hydration status: euvolemic

## 2018-09-22 NOTE — PROCEDURES
[address] [facility]                                COLONOSCOPY REPORT    PATIENT NAME: Hakan Escalante                     :        1950  MED REC NO:   [id number]                          ROOM:       2122  ACCOUNT NO:   [de-identified]                          ADMIT DATE: 2018  PROVIDER:     Daniela Foster MD    DATE OF PROCEDURE:  2018    REFERRING PROVIDERS:  Dr. Shay Matthews and Julio Packer MD    PATIENT HISTORY:  A 79-year-old male, room number 24, 25. INSTRUMENT USED:  Olympus PCF-H190DL. MEDICATIONS OF PROCEDURE:  The patient was premedicated with Diprivan  intravenously as administered by the anesthesiology service. INDICATIONS:  The patient was admitted after an outpatient colonoscopy  yesterday with profuse postpolypectomy bleeding. A large sigmoid colon  polyp was snared and removed, and the procedure was immediately complicated  by profuse bleeding from the polyp stalk. The patient was stabilized and  is now hemodynamically stable with a hemoglobin of 11.3. He is undergoing  colonoscopy to visualize the polypectomy site with endoscopic treatment as  appropriate. DESCRIPTION OF PROCEDURE:  The colonoscope was inserted into the rectal  vault and passed up into the descending colon. There was a small amount of  retained pink-tinged blood and this was suctioned away. The polypectomy  site in the sigmoid colon was readily identified. There was no active  bleeding from the area, but there was a dark central clot. Two Endoclips  were placed in the center of the lesion, and the periphery of the lesion  was injected with a total of 6.0 mL of 1:10,000 epinephrine solution. IMPRESSION:  1. No active bleeding. 2.  The sigmoid polypectomy site was prophylactically treated  endoscopically with Endoclip placement and epinephrine injection.     PLAN:  The patient can transfer out of the ICU. He can eat. His H&H will  be checked every 12 hours now. If stable, he can be discharged tomorrow.         Maicol Kebede MD    D: 09/22/2018 11:37:14       T: 09/22/2018 11:38:23     [email or web address]  Job#: [id number]     Doc#: [id number]    CC:  MD Gavin Molina MD Fran Hire, MD Lonna Snowball

## 2018-09-23 VITALS
OXYGEN SATURATION: 95 % | HEART RATE: 86 BPM | TEMPERATURE: 97.8 F | BODY MASS INDEX: 23.97 KG/M2 | SYSTOLIC BLOOD PRESSURE: 97 MMHG | DIASTOLIC BLOOD PRESSURE: 64 MMHG | RESPIRATION RATE: 18 BRPM | WEIGHT: 196.87 LBS | HEIGHT: 76 IN

## 2018-09-23 LAB
ANION GAP SERPL CALCULATED.3IONS-SCNC: 7 MMOL/L (ref 3–16)
BASOPHILS ABSOLUTE: 0 K/UL (ref 0–0.2)
BASOPHILS RELATIVE PERCENT: 0.4 %
BUN BLDV-MCNC: 11 MG/DL (ref 7–20)
CALCIUM SERPL-MCNC: 7.7 MG/DL (ref 8.3–10.6)
CHLORIDE BLD-SCNC: 110 MMOL/L (ref 99–110)
CO2: 23 MMOL/L (ref 21–32)
CREAT SERPL-MCNC: 0.8 MG/DL (ref 0.8–1.3)
EOSINOPHILS ABSOLUTE: 0.1 K/UL (ref 0–0.6)
EOSINOPHILS RELATIVE PERCENT: 2.3 %
GFR AFRICAN AMERICAN: >60
GFR NON-AFRICAN AMERICAN: >60
GLUCOSE BLD-MCNC: 113 MG/DL (ref 70–99)
HCT VFR BLD CALC: 26.2 % (ref 40.5–52.5)
HCT VFR BLD CALC: 28.1 % (ref 40.5–52.5)
HEMOGLOBIN: 8.9 G/DL (ref 13.5–17.5)
HEMOGLOBIN: 9.4 G/DL (ref 13.5–17.5)
LYMPHOCYTES ABSOLUTE: 1.1 K/UL (ref 1–5.1)
LYMPHOCYTES RELATIVE PERCENT: 25.5 %
MCH RBC QN AUTO: 31.2 PG (ref 26–34)
MCHC RBC AUTO-ENTMCNC: 34.1 G/DL (ref 31–36)
MCV RBC AUTO: 91.5 FL (ref 80–100)
MONOCYTES ABSOLUTE: 0.3 K/UL (ref 0–1.3)
MONOCYTES RELATIVE PERCENT: 7.2 %
NEUTROPHILS ABSOLUTE: 2.8 K/UL (ref 1.7–7.7)
NEUTROPHILS RELATIVE PERCENT: 64.6 %
PDW BLD-RTO: 13.9 % (ref 12.4–15.4)
PLATELET # BLD: 112 K/UL (ref 135–450)
PMV BLD AUTO: 8.4 FL (ref 5–10.5)
POTASSIUM REFLEX MAGNESIUM: 3.9 MMOL/L (ref 3.5–5.1)
RBC # BLD: 2.86 M/UL (ref 4.2–5.9)
SODIUM BLD-SCNC: 140 MMOL/L (ref 136–145)
WBC # BLD: 4.3 K/UL (ref 4–11)

## 2018-09-23 PROCEDURE — 85025 COMPLETE CBC W/AUTO DIFF WBC: CPT

## 2018-09-23 PROCEDURE — 80048 BASIC METABOLIC PNL TOTAL CA: CPT

## 2018-09-23 PROCEDURE — 93005 ELECTROCARDIOGRAM TRACING: CPT | Performed by: INTERNAL MEDICINE

## 2018-09-23 PROCEDURE — 85018 HEMOGLOBIN: CPT

## 2018-09-23 PROCEDURE — 99232 SBSQ HOSP IP/OBS MODERATE 35: CPT | Performed by: SURGERY

## 2018-09-23 PROCEDURE — 85014 HEMATOCRIT: CPT

## 2018-09-23 PROCEDURE — 36415 COLL VENOUS BLD VENIPUNCTURE: CPT

## 2018-09-23 NOTE — FLOWSHEET NOTE
4 Eyes Skin Assessment     The patient is being assess for  Transfer to New Unit    I agree that 2 RN's have performed a thorough Head to Toe Skin Assessment on the patient. ALL assessment sites listed below have been assessed. Areas assessed by both nurses:  [x]   Head, Face, and Ears   [x]   Shoulders, Back, and Chest  [x]   Arms, Elbows, and Hands   [x]   Coccyx, Sacrum, and IschIum  [x]   Legs, Feet, and Heels        Does the Patient have Skin Breakdown?   No         Morgan Prevention initiated:  No   Wound Care Orders initiated:  No      [facility] nurse consulted for Pressure Injury (Stage 3,4, Unstageable, DTI, NWPT, and Complex wounds), New and Established Ostomies:  No      Nurse 1 eSignature: Electronically signed by Radha Choi RN on 9/22/18 at 8:30 PM    **SHARE this note so that the co-signing nurse is able to place an eSignature**    Nurse 2 eSignature: Electronically signed by Julio Castro RN on 9/23/18 at 1:14 AM

## 2018-09-23 NOTE — DISCHARGE SUMMARY
lesions. No evidence of obstructive uropathy. No abdominal aortic aneurysm. Bones/Soft Tissues: No acute osseous findings identified. Mild degenerative disc disease of the lumbar spine. 1. No acute findings identified in the abdomen and pelvis. No CT evidence of active hemorrhage in the abdomen and pelvis. Scattered foci of hyperdensity in the colon likely reflective of blood. Other Significant Diagnostic Studies: As described above    Treatments: As described above    Disposition: home    Discharge Medications:     Arabella Jara   Home Medication Instructions [facility]:[phone]    Printed on:09/23/18 6169   Medication Information                      Astaxanthin 4 MG CAPS  Take 4 mg by mouth daily             Omega-3 Fatty Acids (FISH OIL) 1000 MG CAPS  Take 1,000 mg by mouth daily                 35 Minutes spent on patient evaluation, counseling and discharge planning.      Signed:  Mundo Hollis MD  9/23/2018, 2:01 PM

## 2018-09-23 NOTE — FLOWSHEET NOTE
Pt arrived to 4133 via wc from ICU with all belongings. Oriented to room and call light. Call light in reach.

## 2018-09-24 LAB
BLOOD BANK DISPENSE STATUS: NORMAL
BLOOD BANK DISPENSE STATUS: NORMAL
BLOOD BANK PRODUCT CODE: NORMAL
BLOOD BANK PRODUCT CODE: NORMAL
BPU ID: NORMAL
BPU ID: NORMAL
DESCRIPTION BLOOD BANK: NORMAL
DESCRIPTION BLOOD BANK: NORMAL

## 2018-09-24 PROCEDURE — 93010 ELECTROCARDIOGRAM REPORT: CPT | Performed by: INTERNAL MEDICINE

## 2018-09-25 LAB
EKG ATRIAL RATE: 88 BPM
EKG DIAGNOSIS: NORMAL
EKG P AXIS: 66 DEGREES
EKG P-R INTERVAL: 146 MS
EKG Q-T INTERVAL: 368 MS
EKG QRS DURATION: 78 MS
EKG QTC CALCULATION (BAZETT): 445 MS
EKG R AXIS: 25 DEGREES
EKG T AXIS: 99 DEGREES
EKG VENTRICULAR RATE: 88 BPM

## 2022-09-07 ENCOUNTER — HOSPITAL ENCOUNTER (EMERGENCY)
Age: 72
Discharge: HOME | End: 2022-09-07
Payer: MEDICARE

## 2022-09-07 ENCOUNTER — APPOINTMENT (OUTPATIENT)
Dept: CT IMAGING | Age: 72
End: 2022-09-07
Payer: MEDICARE

## 2022-09-07 VITALS
SYSTOLIC BLOOD PRESSURE: 123 MMHG | HEART RATE: 70 BPM | WEIGHT: 188.93 LBS | TEMPERATURE: 98.4 F | RESPIRATION RATE: 14 BRPM | HEIGHT: 76 IN | DIASTOLIC BLOOD PRESSURE: 80 MMHG | OXYGEN SATURATION: 97 % | BODY MASS INDEX: 23.01 KG/M2

## 2022-09-07 DIAGNOSIS — N13.2 HYDRONEPHROSIS WITH URINARY OBSTRUCTION DUE TO URETERAL CALCULUS: Primary | ICD-10-CM

## 2022-09-07 LAB
ANION GAP SERPL CALCULATED.3IONS-SCNC: 8 MMOL/L (ref 3–16)
BACTERIA: ABNORMAL /HPF
BASOPHILS ABSOLUTE: 0 K/UL (ref 0–0.2)
BASOPHILS RELATIVE PERCENT: 0.3 %
BILIRUBIN URINE: NEGATIVE
BLOOD, URINE: ABNORMAL
BUN BLDV-MCNC: 14 MG/DL (ref 7–20)
CALCIUM SERPL-MCNC: 10.1 MG/DL (ref 8.3–10.6)
CHLORIDE BLD-SCNC: 102 MMOL/L (ref 99–110)
CLARITY: CLEAR
CO2: 28 MMOL/L (ref 21–32)
COLOR: YELLOW
CREAT SERPL-MCNC: 1 MG/DL (ref 0.8–1.3)
EOSINOPHILS ABSOLUTE: 0.1 K/UL (ref 0–0.6)
EOSINOPHILS RELATIVE PERCENT: 0.7 %
EPITHELIAL CELLS, UA: 0 /HPF (ref 0–5)
GFR AFRICAN AMERICAN: >60
GFR NON-AFRICAN AMERICAN: >60
GLUCOSE BLD-MCNC: 108 MG/DL (ref 70–99)
GLUCOSE URINE: NEGATIVE MG/DL
HCT VFR BLD CALC: 44.8 % (ref 40.5–52.5)
HEMOGLOBIN: 15.3 G/DL (ref 13.5–17.5)
HYALINE CASTS: 0 /LPF (ref 0–8)
KETONES, URINE: ABNORMAL MG/DL
LEUKOCYTE ESTERASE, URINE: NEGATIVE
LYMPHOCYTES ABSOLUTE: 1 K/UL (ref 1–5.1)
LYMPHOCYTES RELATIVE PERCENT: 12.1 %
MCH RBC QN AUTO: 31.2 PG (ref 26–34)
MCHC RBC AUTO-ENTMCNC: 34.2 G/DL (ref 31–36)
MCV RBC AUTO: 91.1 FL (ref 80–100)
MICROSCOPIC EXAMINATION: YES
MONOCYTES ABSOLUTE: 0.5 K/UL (ref 0–1.3)
MONOCYTES RELATIVE PERCENT: 5.8 %
NEUTROPHILS ABSOLUTE: 6.5 K/UL (ref 1.7–7.7)
NEUTROPHILS RELATIVE PERCENT: 81.1 %
NITRITE, URINE: NEGATIVE
PDW BLD-RTO: 14.8 % (ref 12.4–15.4)
PH UA: 5.5 (ref 5–8)
PLATELET # BLD: 165 K/UL (ref 135–450)
PMV BLD AUTO: 8.6 FL (ref 5–10.5)
POTASSIUM REFLEX MAGNESIUM: 4.8 MMOL/L (ref 3.5–5.1)
PROTEIN UA: NEGATIVE MG/DL
RBC # BLD: 4.91 M/UL (ref 4.2–5.9)
RBC UA: 47 /HPF (ref 0–4)
SODIUM BLD-SCNC: 138 MMOL/L (ref 136–145)
SPECIFIC GRAVITY UA: 1.02 (ref 1–1.03)
URINE REFLEX TO CULTURE: ABNORMAL
URINE TYPE: ABNORMAL
UROBILINOGEN, URINE: 0.2 E.U./DL
WBC # BLD: 8 K/UL (ref 4–11)
WBC UA: 1 /HPF (ref 0–5)

## 2022-09-07 PROCEDURE — 74176 CT ABD & PELVIS W/O CONTRAST: CPT

## 2022-09-07 PROCEDURE — 36415 COLL VENOUS BLD VENIPUNCTURE: CPT

## 2022-09-07 PROCEDURE — 96376 TX/PRO/DX INJ SAME DRUG ADON: CPT

## 2022-09-07 PROCEDURE — 85025 COMPLETE CBC W/AUTO DIFF WBC: CPT

## 2022-09-07 PROCEDURE — 6360000002 HC RX W HCPCS: Performed by: PHYSICIAN ASSISTANT

## 2022-09-07 PROCEDURE — 81001 URINALYSIS AUTO W/SCOPE: CPT

## 2022-09-07 PROCEDURE — 2580000003 HC RX 258: Performed by: PHYSICIAN ASSISTANT

## 2022-09-07 PROCEDURE — 96374 THER/PROPH/DIAG INJ IV PUSH: CPT

## 2022-09-07 PROCEDURE — 99284 EMERGENCY DEPT VISIT MOD MDM: CPT

## 2022-09-07 PROCEDURE — 80048 BASIC METABOLIC PNL TOTAL CA: CPT

## 2022-09-07 PROCEDURE — 96375 TX/PRO/DX INJ NEW DRUG ADDON: CPT

## 2022-09-07 RX ORDER — ONDANSETRON 2 MG/ML
4 INJECTION INTRAMUSCULAR; INTRAVENOUS ONCE
Status: COMPLETED | OUTPATIENT
Start: 2022-09-07 | End: 2022-09-07

## 2022-09-07 RX ORDER — HYDROCODONE BITARTRATE AND ACETAMINOPHEN 5; 325 MG/1; MG/1
.5-1 TABLET ORAL
Qty: 12 TABLET | Refills: 0 | Status: SHIPPED | OUTPATIENT
Start: 2022-09-07 | End: 2022-09-10

## 2022-09-07 RX ORDER — TAMSULOSIN HYDROCHLORIDE 0.4 MG/1
0.4 CAPSULE ORAL DAILY
Qty: 5 CAPSULE | Refills: 0 | Status: SHIPPED | OUTPATIENT
Start: 2022-09-07 | End: 2022-09-12

## 2022-09-07 RX ORDER — IBUPROFEN 600 MG/1
600 TABLET ORAL EVERY 6 HOURS PRN
Qty: 30 TABLET | Refills: 0 | Status: SHIPPED | OUTPATIENT
Start: 2022-09-07

## 2022-09-07 RX ORDER — KETOROLAC TROMETHAMINE 30 MG/ML
15 INJECTION, SOLUTION INTRAMUSCULAR; INTRAVENOUS ONCE
Status: COMPLETED | OUTPATIENT
Start: 2022-09-07 | End: 2022-09-07

## 2022-09-07 RX ORDER — ONDANSETRON 4 MG/1
4 TABLET, ORALLY DISINTEGRATING ORAL EVERY 8 HOURS PRN
Qty: 20 TABLET | Refills: 0 | Status: SHIPPED | OUTPATIENT
Start: 2022-09-07

## 2022-09-07 RX ORDER — 0.9 % SODIUM CHLORIDE 0.9 %
500 INTRAVENOUS SOLUTION INTRAVENOUS ONCE
Status: COMPLETED | OUTPATIENT
Start: 2022-09-07 | End: 2022-09-07

## 2022-09-07 RX ORDER — MORPHINE SULFATE 2 MG/ML
2 INJECTION, SOLUTION INTRAMUSCULAR; INTRAVENOUS
Status: DISPENSED | OUTPATIENT
Start: 2022-09-07 | End: 2022-09-07

## 2022-09-07 RX ADMIN — MORPHINE SULFATE 2 MG: 2 INJECTION, SOLUTION INTRAMUSCULAR; INTRAVENOUS at 14:55

## 2022-09-07 RX ADMIN — HYDROMORPHONE HYDROCHLORIDE 0.5 MG: 1 INJECTION, SOLUTION INTRAMUSCULAR; INTRAVENOUS; SUBCUTANEOUS at 15:21

## 2022-09-07 RX ADMIN — SODIUM CHLORIDE 500 ML: 9 INJECTION, SOLUTION INTRAVENOUS at 14:56

## 2022-09-07 RX ADMIN — KETOROLAC TROMETHAMINE 15 MG: 30 INJECTION, SOLUTION INTRAMUSCULAR at 15:32

## 2022-09-07 RX ADMIN — ONDANSETRON 4 MG: 2 INJECTION INTRAMUSCULAR; INTRAVENOUS at 15:31

## 2022-09-07 RX ADMIN — ONDANSETRON 4 MG: 2 INJECTION INTRAMUSCULAR; INTRAVENOUS at 14:56

## 2022-09-07 ASSESSMENT — PAIN DESCRIPTION - DESCRIPTORS
DESCRIPTORS: PATIENT UNABLE TO DESCRIBE
DESCRIPTORS: STABBING;SHARP;ACHING
DESCRIPTORS: PATIENT UNABLE TO DESCRIBE

## 2022-09-07 ASSESSMENT — PAIN DESCRIPTION - ORIENTATION
ORIENTATION: LEFT

## 2022-09-07 ASSESSMENT — PAIN DESCRIPTION - LOCATION
LOCATION: FLANK
LOCATION: FLANK
LOCATION: ABDOMEN
LOCATION: ABDOMEN

## 2022-09-07 ASSESSMENT — ENCOUNTER SYMPTOMS
ABDOMINAL PAIN: 0
CHEST TIGHTNESS: 0
NAUSEA: 1
VOMITING: 1
SHORTNESS OF BREATH: 0
EYES NEGATIVE: 1

## 2022-09-07 ASSESSMENT — PAIN DESCRIPTION - PAIN TYPE
TYPE: ACUTE PAIN
TYPE: ACUTE PAIN

## 2022-09-07 ASSESSMENT — PAIN - FUNCTIONAL ASSESSMENT: PAIN_FUNCTIONAL_ASSESSMENT: 0-10

## 2022-09-07 ASSESSMENT — PAIN SCALES - GENERAL
PAINLEVEL_OUTOF10: 4
PAINLEVEL_OUTOF10: 9
PAINLEVEL_OUTOF10: 0
PAINLEVEL_OUTOF10: 10

## 2022-09-07 NOTE — ED NOTES
Discharge and education instructions reviewed. Patient verbalized understanding, teach-back successful. Patient denied questions at this time. No acute distress noted. Patient instructed to follow-up as noted - return to emergency department if symptoms worsen. Patient verbalized understanding. Discharged per EDMD with discharge instructions.           Deja Izquierdo RN  09/07/22 3552

## 2022-09-07 NOTE — ED PROVIDER NOTES
[address]  [address] [address]  Dept: [phone]  Loc: [phone]  eMERGENCYdEPARTMENT eNCOUnter      Pt Name: Gallo Bro  MRN: [id number]  [place]ance 1950  Date of evaluation: 9/7/2022  Provider:Gabby Rodrigues PA-C    CHIEF COMPLAINT       Chief Complaint   Patient presents with    Flank Pain     Pt c/o left sided flank pain since 1 am. Hx of kidney stones. Reports difficulty urinating. CRITICAL CARE TIME   Total Critical Care time was 0 minutes, excluding separately reportable procedures. There was a high probability of clinically significant/life threatening deterioration in the patient's condition which required my urgentintervention. HISTORY OF PRESENT ILLNESS  (Location/Symptom, Timing/Onset, Context/Setting, Quality, Duration,Modifying Factors, Severity.)   Gallo Bro is a 71 y.o. male who presents to the emergency department by private vehicle with concern for kidney stone. Patient states he began with pain to his left flank around 2 AM this morning. Pain has been intermittent throughout the course the day. Patient currently has pain to his left flank rated as a 10/10 in severity. Has history of kidney stones, states pain similar to previous kidney stone. He has associated nausea and vomiting. Denies any hematuria or urinary symptoms. Denies chest pain, shortness of breath, abdominal pain, fevers. Nursing Notes were reviewedand agreed with or any disagreements were addressed in the HPI. REVIEW OF SYSTEMS    (2-9 systems for level 4, 10 or more for level 5)     Review of Systems   Constitutional:  Negative for chills and fever. HENT: Negative. Eyes: Negative. Respiratory:  Negative for chest tightness and shortness of breath. Cardiovascular: Negative. Gastrointestinal:  Positive for nausea and vomiting. Negative for abdominal pain.    Genitourinary:  Positive for flank pain. Musculoskeletal:  Negative for arthralgias and myalgias. Skin: Negative. Neurological: Negative. Psychiatric/Behavioral:  Negative for behavioral problems and confusion. Except as noted above the remainder of the review of systems was reviewed and negative. PAST MEDICAL HISTORY         Diagnosis Date    Kidney stone        SURGICAL HISTORY           Procedure Laterality Date    BACK SURGERY      KNEE SURGERY      SIGMOIDOSCOPY N/A 2018    SIGMOIDOSCOPY CONTROL HEMORRHAGE performed by Berenice Lobato MD at [address]     [unfilled]    ALLERGIES     Meperidine    FAMILY HISTORY           Problem Relation Age of Onset    Heart Disease Mother     Cancer Father      Family Status   Relation Name Status    Mother      Father          SOCIAL HISTORY      reports that he has never smoked. He has never used smokeless tobacco. He reports current alcohol use. He reports that he does not use drugs. PHYSICAL EXAM    (up to 7 for level 4, 8 or more for level 5)     ED Triage Vitals   Enc Vitals Group      BP 22 1428 (!) 162/101      Heart Rate 22 1445 57      Resp 22 1428 16      Temp 22 1428 98.4 °F (36.9 °C)      Temp Source 22 1428 Oral      SpO2 22 1428 95 %      Weight 22 1428 188 lb 15 oz (85.7 kg)      Height 22 1428 6' 4\" (1.93 m)      Head Circumference --       Peak Flow --       Pain Score --       Pain Loc --       Pain Edu? --       Excl. in [address]? --         Physical Exam  Vitals reviewed. Constitutional:       General: He is in acute distress (Pain). HENT:      Head: Normocephalic and atraumatic. Cardiovascular:      Rate and Rhythm: Normal rate and regular rhythm. Pulmonary:      Effort: Pulmonary effort is normal. No respiratory distress. Abdominal:      Palpations: Abdomen is soft. Tenderness: There is no abdominal tenderness.  There is no right CVA tenderness, left CVA tenderness, guarding or rebound. Musculoskeletal:         General: Normal range of motion. Cervical back: Normal range of motion and neck supple. Skin:     General: Skin is warm. Neurological:      General: No focal deficit present. Mental Status: He is alert and oriented to person, place, and time. Psychiatric:         Mood and Affect: Mood normal.         Behavior: Behavior normal.         DIAGNOSTIC RESULTS     EKG: All EKG's are interpreted by the Emergency Department Physician who either signs or Co-signs this chart in the absence of a cardiologist.    RADIOLOGY:   Non-plain film images such as CT, Ultrasound and MRI are read by the radiologist. Plain radiographic images are visualized and preliminarilyinterpreted by the emergency physician with the below findings:    Interpretation per the Radiologist below,if available at the time of this note:    CT ABDOMEN PELVIS WO CONTRAST Additional Contrast? None   Final Result   1.  5 mm stone within the intravesicular portion of the distal left ureter   causing mild upstream hydronephrosis and hydroureter. 2.  3 punctate nonobstructing right renal stones. 3.  Sigmoid colon diverticulosis without evidence of diverticulitis. LABS:  Labs Reviewed   BASIC METABOLIC PANEL W/ REFLEX TO MG FOR LOW K - Abnormal; Notable for the following components:       Result Value    Glucose 108 (*)     All other components within normal limits   URINALYSIS WITH REFLEX TO CULTURE - Abnormal; Notable for the following components:    Ketones, Urine TRACE (*)     Blood, Urine LARGE (*)     All other components within normal limits   MICROSCOPIC URINALYSIS - Abnormal; Notable for the following components:    RBC, UA 47 (*)     All other components within normal limits   CBC WITH AUTO DIFFERENTIAL       All other labs were within normal range or not returned as of this dictation.     EMERGENCY DEPARTMENT COURSE and DIFFERENTIAL DIAGNOSIS/MDM: Vitals:    Vitals:    09/07/22 1715 09/07/22 1730 09/07/22 1745 09/07/22 1758   BP: 125/81 123/80 125/81 123/80   Pulse: 72 69 69 70   Resp: 20 13 14 14   Temp:       TempSrc:       SpO2: 96% 92% 95% 97%   Weight:       Height:           MDM    Patient presents ED with HPI noted above. CT abdomen pelvis without contrast showed a 5 mm stone within the intravesicular portion of the distal left ureter with mild hydronephrosis. Labs as above. Urine showed no evidence of infection. Blood noted. BMP showed no renal impairment. Patient no leukocytosis/leukopenia. He was given 2 mg IV morphine followed by 0.5 mg IV Dilaudid, then IV Toradol. He had 2 rounds of Zofran IV fluid. Pain adequately controlled at time of discharge. Patient follow-up closely with urology. Told to contact tomorrow transfer close follow-up. Was on the call with urology for a period Dr. Ray Jay was kindly speaking with me and I made him aware of this patient as he will need close follow-up. Patient to call office tomorrow. CT showed multiple incidental findings including 3 punctate nonobstructing right renal stones, multiple small hypodense disease in liver that are stable likely represent cyst or hemangioma no definitive follow-up recommended, and sigmoid diverticulosis. Patient discharged home on Flomax. He was given Zofran for nausea. He was told to take ibuprofen and Norco if needed for pain. Narcotic precautions discussed. Return precautions discussed. Patient discharged home in stable condition. The patient tolerated their visit well. I saw the patient independently with physician available for consultation as needed. I have discussed the findings of today's workup with the patient and addressed the patient's questions and concerns. Important warning signs as well as new or worsening symptoms which would necessitate immediate return to the ED were discussed.  The plan is to discharge from the ED at this time, and the patient is in stable condition. The patient acknowledged understanding is agreeable with this plan. CONSULTS:  None    PROCEDURES:  Procedures    FINAL IMPRESSION      1. Hydronephrosis with urinary obstruction due to ureteral calculus          DISPOSITION/PLAN   [unfilled]    PATIENT REFERRED TO:  [facility] Emergency Department  [address] [address]  [phone]  Go to   If symptoms worsen    Farhad Powell MD  [address] [place] [facility] [address]  [phone]    Call   For follow up and reevaluation. Call tomorrow to arrange close follow up. Farhad Powell MD  [facility] [address] [facility]  [phone]          DISCHARGE MEDICATIONS:  Discharge Medication List as of 9/7/2022  5:56 PM        START taking these medications    Details   tamsulosin (FLOMAX) 0.4 MG capsule Take 1 capsule by mouth daily for 5 doses, Disp-5 capsule, R-0Print      HYDROcodone-acetaminophen (NORCO) 5-325 MG per tablet Take 0.5-1 tablets by mouth every 4-6 hours as needed for Pain for up to 3 days. , Disp-12 tablet, R-0Print      ibuprofen (ADVIL;MOTRIN) 600 MG tablet Take 1 tablet by mouth every 6 hours as needed for Pain, Disp-30 tablet, R-0Print      ondansetron (ZOFRAN ODT) 4 MG disintegrating tablet Take 1 tablet by mouth every 8 hours as needed for Nausea, Disp-20 tablet, R-0Print             (Please note that portions of this note were completed with a voice recognition program.  Efforts were made to edit the dictations but occasionally words are mis-transcribed.)    Bebe Client, TELLY Erazo, [place]  09/07/22 1937

## 2022-09-07 NOTE — DISCHARGE INSTRUCTIONS
Call Dr. Tiffany Maguire office tomorrow to arrange for close follow-up. If they cannot see before that we can call your primary care doctor to arrange for follow-up if you need additional pain medicine. Try to control pain with ibuprofen. If that is not controlling pain you can take the [address] can take half to 1 tab every 4-6 hours. If pain is uncontrolled return to ED for reevaluation. Do not drive, operate vehicle or drink alcohol while taking pain medication. Take Flomax, this will help facilitate stone passage. Strain your urine to try to catch stone. Zofran is for nausea. Return to ED if uncontrolled pain, fevers, feel as if you are getting sick or any other concerning symptoms.

## (undated) DEVICE — ENDO CARRY-ON PROCEDURE KIT: Brand: ENDO CARRY-ON PROCEDURE KIT

## (undated) DEVICE — NEEDLE 25GAX5.0MM INJ CARR LOCKE